# Patient Record
Sex: FEMALE
[De-identification: names, ages, dates, MRNs, and addresses within clinical notes are randomized per-mention and may not be internally consistent; named-entity substitution may affect disease eponyms.]

---

## 2024-02-06 ENCOUNTER — APPOINTMENT (OUTPATIENT)
Dept: OTOLARYNGOLOGY | Facility: CLINIC | Age: 54
End: 2024-02-06
Payer: COMMERCIAL

## 2024-02-06 VITALS
SYSTOLIC BLOOD PRESSURE: 173 MMHG | WEIGHT: 128 LBS | DIASTOLIC BLOOD PRESSURE: 91 MMHG | BODY MASS INDEX: 23.55 KG/M2 | HEART RATE: 99 BPM | HEIGHT: 62 IN | TEMPERATURE: 97.3 F

## 2024-02-06 DIAGNOSIS — Z78.9 OTHER SPECIFIED HEALTH STATUS: ICD-10-CM

## 2024-02-06 DIAGNOSIS — Z80.9 FAMILY HISTORY OF MALIGNANT NEOPLASM, UNSPECIFIED: ICD-10-CM

## 2024-02-06 DIAGNOSIS — M54.2 CERVICALGIA: ICD-10-CM

## 2024-02-06 PROBLEM — Z00.00 ENCOUNTER FOR PREVENTIVE HEALTH EXAMINATION: Status: ACTIVE | Noted: 2024-02-06

## 2024-02-06 PROCEDURE — 31575 DIAGNOSTIC LARYNGOSCOPY: CPT

## 2024-02-06 PROCEDURE — 99204 OFFICE O/P NEW MOD 45 MIN: CPT | Mod: 25

## 2024-02-06 NOTE — HISTORY OF PRESENT ILLNESS
[de-identified] : 53F here for initial evaluation.  For the past 5 months or so, she c/o left sided neck discomfort. It is described as a vague ache, which is constant, but most apparent when laying on that side or awakening after sleeping on that side. There is no difficulty eating, breathing, swallowing or talking, but she feels her voice can be a little raspy. She had seen local ENT and exam was normal at that time She also c/o 'whistling' noise in her nose when she breathes in on the left side and also has had two episodes of small amount of bleeding from the left side. No tobacco, social etoh.  ROS otherwise unremarkable.

## 2024-02-06 NOTE — CONSULT LETTER
[Dear  ___] : Dear  [unfilled], [Courtesy Letter:] : I had the pleasure of seeing your patient, [unfilled], in my office today. [Consult Closing:] : Thank you very much for allowing me to participate in the care of this patient.  If you have any questions, please do not hesitate to contact me. [Sincerely,] : Sincerely, [FreeTextEntry3] : Adam Coronado MD Department of Otolaryngology, Head and Neck Surgery

## 2024-02-06 NOTE — ASSESSMENT
[FreeTextEntry1] : 53F here for initial evaluation. For the past 5 months or so, she c/o left sided neck discomfort. It is described as a vague ache, which is constant, but most apparent when laying on that side or awakening after sleeping on that side. There is no difficulty eating, breathing, swallowing or talking, but she feels her voice can be a little raspy. She had seen local ENT and exam was normal at that time. She also c/o 'whistling' noise in her nose when she breathes in on the left side and also has had two episodes of small amount of bleeding from the left side. She points to the left neck around level 1b/2a. Complete and comprehensive head and neck exam, including nasal endoscopy and fiberoptic laryngoscopy, is unremarkable. Given the history and nonfocal/normal exam, I would favor her persistent sx to be musculoskeletal or maybe even c-spine related. To be complete, will get MRI to ensure nothing further given length of time of sx and persistence and review w pt thereafter.

## 2024-02-06 NOTE — PHYSICAL EXAM
[de-identified] : soft, flat, no masses/lesions [Midline] : trachea located in midline position [Laryngoscopy Performed] : laryngoscopy was performed, see procedure section for findings [Normal] : no rashes

## 2024-02-06 NOTE — PROCEDURE
[FreeTextEntry3] : Fiberoptic Nasolaryngoscopy: left septal spurring nasal airways widely patent no masses/lesions choana clear upper airway widely patent no masses/lesions TVF symmetric and fully mobile